# Patient Record
Sex: FEMALE
[De-identification: names, ages, dates, MRNs, and addresses within clinical notes are randomized per-mention and may not be internally consistent; named-entity substitution may affect disease eponyms.]

---

## 2019-02-22 ENCOUNTER — HOSPITAL ENCOUNTER (EMERGENCY)
Dept: HOSPITAL 88 - FSED | Age: 54
Discharge: HOME | End: 2019-02-22
Payer: MEDICARE

## 2019-02-22 VITALS — BODY MASS INDEX: 49.34 KG/M2 | WEIGHT: 289 LBS | HEIGHT: 64 IN

## 2019-02-22 DIAGNOSIS — R05: Primary | ICD-10-CM

## 2019-02-22 DIAGNOSIS — R06.00: ICD-10-CM

## 2019-02-22 DIAGNOSIS — J15.9: ICD-10-CM

## 2019-02-22 DIAGNOSIS — R07.89: ICD-10-CM

## 2019-02-22 DIAGNOSIS — I10: ICD-10-CM

## 2019-02-22 DIAGNOSIS — E11.9: ICD-10-CM

## 2019-02-22 PROCEDURE — 83880 ASSAY OF NATRIURETIC PEPTIDE: CPT

## 2019-02-22 PROCEDURE — 80053 COMPREHEN METABOLIC PANEL: CPT

## 2019-02-22 PROCEDURE — 93005 ELECTROCARDIOGRAM TRACING: CPT

## 2019-02-22 PROCEDURE — 85025 COMPLETE CBC W/AUTO DIFF WBC: CPT

## 2019-02-22 PROCEDURE — 84484 ASSAY OF TROPONIN QUANT: CPT

## 2019-02-22 PROCEDURE — 71046 X-RAY EXAM CHEST 2 VIEWS: CPT

## 2019-02-22 PROCEDURE — 82553 CREATINE MB FRACTION: CPT

## 2019-02-22 PROCEDURE — 99284 EMERGENCY DEPT VISIT MOD MDM: CPT

## 2019-02-22 NOTE — NUR
PT STRONGLY ENCOURAGED TO SEEK 911 CARE IF WORSE AND NOT BETTER AT ANYTIME. PT 
STATES UNDERSTANDING.

## 2019-02-22 NOTE — XMS REPORT
Patient Summary Document

                             Created on: 2019



TWANLILIBETH

External Reference #: [id number]

: 1965

Sex: Female



Demographics







                          Address                   [address] [place]  [address]

 

                          Home Phone                [phone]

 

                          Preferred Language        Unknown

 

                          Marital Status            Unknown

 

                          Spiritism Affiliation     Unknown

 

                          Race                      Unknown

 

                                        Additional Race(s)  

 

                          Ethnic Group              Unknown





Author







                          Author                    [facility]

 

                          [facility]

 

                          Address                   Unknown

 

                          Phone                     Unavailable







Support







                Name            Relationship    Address         Phone

 

                    KIKO TRENT     PRS                 [address], [place]  [phone]

 

                    KIKO TRENT     PRS                  [address] [place] [address]  [phone]







Care Team Providers







                    Care Team Member Name    Role                Phone

 

                          Unavailable               Unavailable







Payers







             Payer Name    Policy Type    Policy Number    Effective Date    Expiration Date







Problems

This patient has no known problems.



Allergies, Adverse Reactions, Alerts







          Allergy Name    Allergy Type    Status    Severity    Reaction(s)    Onset Date    Inactive 

Date                      Treating Clinician        Comments

 

        No Known Allergies    DA      Active    U               2018-11-15 00:00:00                     

 

        No Known Allergies    DA      Active    U               2017 00:00:00                     







Medications

This patient has no known medications.



Encounters







             Start Date/Time    End Date/Time    Encounter Type    Admission Type    Attending Clinicians

                    Care Facility       Care Department     Encounter ID

 

        2018-10-04 00:00:00    2018-10-04 00:00:00    Outpatient                    [facility]     [id number]

 

        2018 00:00:00    2018 00:00:00    Outpatient                    [facility]     [id number]

 

        2018 00:00:00    2018 00:00:00    Outpatient                    [facility]     [id number]

 

        2018 00:00:00    2018 00:00:00    Outpatient                    [facility]     [id number]

 

        2018 00:00:00    2018 00:00:00    Outpatient                    [facility]     [id number]

 

        2018 12:59:35    2018 12:59:35    Outpatient                    [facility]     [id number]

 

        2018 00:00:00    2018 00:00:00    Outpatient                    [facility]     [id number]

 

        2018 00:00:00    2018 00:00:00    Outpatient                    [facility]     [id number]

 

        2018 00:00:00    2018 00:00:00    Outpatient                    [facility]     [id number]

 

        2018-01-10 00:00:00    2018-01-10 00:00:00    Outpatient                    [facility]     [id number]

 

        2017 00:00:00    2017 00:00:00    Outpatient                    [facility]     [id number]

 

        2017 00:00:00    2017 00:00:00    Outpatient                    [facility]     [id number]

 

        2017 00:00:00    2017 00:00:00    Outpatient                    [facility]     [id number]

 

        2017 00:00:00    2017 00:00:00    Outpatient                    [facility]     [id number]

 

        2017 15:50:28    2017 15:50:28    Outpatient                    [facility]     [id number]

 

        2017 13:52:17    2017 13:52:17    Outpatient                    [facility]     [id number]

 

        2017 12:45:36    2017 12:45:36    Outpatient                    [facility]     [id number]

 

        2017 00:00:00    2017 00:00:00    Outpatient                    [facility]     [id number]

 

        2017 07:51:25    2017 07:51:25    Outpatient                    [facility]     [id number]

 

        2017 12:56:17    2017 12:56:17    Outpatient                    [facility]     [id number]

 

        2017 00:00:00    2017 00:00:00    Outpatient                    [facility]     [id number]

 

        2017-10-25 00:00:00    2017-10-25 00:00:00    Outpatient                    [facility]     [id number]

 

        2017-10-23 00:00:00    2017-10-23 00:00:00    Outpatient                    [facility]     [id number]

 

        2017-10-11 00:00:00    2017-10-11 00:00:00    Outpatient                    [facility]     [id number]

 

        2017-10-11 00:00:00    2017-10-11 00:00:00    Outpatient                    [facility]     [id number]

 

        2017-10-09 00:00:00    2017-10-09 00:00:00    Outpatient                    [facility]     [id number]

 

        2017-10-09 00:00:00    2017-10-09 00:00:00    Outpatient                    [facility]     [id number]

 

        2017-10-09 00:00:00    2017-10-09 00:00:00    Outpatient                    [facility]     [id number]

 

        2017 00:00:00    2017 00:00:00    Outpatient                    [facility]     [id number]

 

        2017-09-15 10:26:34    2017-09-15 10:26:34    Outpatient                    [facility]     [id number]

 

        2017 08:55:17    2017 08:55:17    Outpatient                    [facility]     [id number]

 

        2017 00:00:00    2017 00:00:00    Outpatient                    [facility]     [id number]

 

        2017 00:00:00    2017 00:00:00    Outpatient                    [facility]     [id number]

 

        2017 00:00:00    2017 00:00:00    Outpatient                    [facility]     [id number]

 

        2017 13:48:00    2017 13:48:00    Outpatient                    [facility]     [id number]

 

        2017 15:20:54    2017 15:20:54    Outpatient                    [facility]     [id number]

 

        2017 10:01:38    2017 10:01:38    Outpatient                    [facility]     [id number]

 

        2017 08:42:15    2017 08:42:15    Outpatient                    [facility]     [id number]

 

        2017 13:05:35    2017 13:05:35    Emergency                    [facility]     [id number]

## 2019-02-22 NOTE — DIAGNOSTIC IMAGING REPORT
PROCEDURE:CXR 2 VIEW - HOPD

 

COMPARISON:None.

 

INDICATIONS:Cough

 

FINDINGS:Heart is not enlarged. There is mild pulmonary vascular 

congestion. Faint opacity with tenting of the left hemidiaphragm likely 

represents atelectasis versus evolving infiltrate. Osseous structures 

appear unremarkable.

 

CONCLUSION:Mild pulmonary vascular congestion with opacity in the left 

lung base. 

 

 

Levi Montgomery D.O.  

Dictated by:  Levi Montgomery D.O. on 2/22/2019 at 17:11     

Electronically approved by:  Levi Montgomery D.O. on 2/22/2019 at 17:11